# Patient Record
Sex: MALE | Race: WHITE | NOT HISPANIC OR LATINO | Employment: UNEMPLOYED | URBAN - METROPOLITAN AREA
[De-identification: names, ages, dates, MRNs, and addresses within clinical notes are randomized per-mention and may not be internally consistent; named-entity substitution may affect disease eponyms.]

---

## 2019-04-10 ENCOUNTER — OFFICE VISIT (OUTPATIENT)
Dept: URGENT CARE | Facility: CLINIC | Age: 59
End: 2019-04-10

## 2019-04-10 ENCOUNTER — APPOINTMENT (OUTPATIENT)
Dept: RADIOLOGY | Facility: IMAGING CENTER | Age: 59
End: 2019-04-10
Attending: PHYSICIAN ASSISTANT

## 2019-04-10 VITALS
WEIGHT: 180 LBS | DIASTOLIC BLOOD PRESSURE: 70 MMHG | HEIGHT: 69 IN | OXYGEN SATURATION: 96 % | TEMPERATURE: 98.1 F | RESPIRATION RATE: 16 BRPM | SYSTOLIC BLOOD PRESSURE: 112 MMHG | BODY MASS INDEX: 26.66 KG/M2 | HEART RATE: 60 BPM

## 2019-04-10 DIAGNOSIS — R06.02 SHORTNESS OF BREATH: ICD-10-CM

## 2019-04-10 DIAGNOSIS — Z72.0 TOBACCO ABUSE: ICD-10-CM

## 2019-04-10 PROCEDURE — 99203 OFFICE O/P NEW LOW 30 MIN: CPT | Performed by: PHYSICIAN ASSISTANT

## 2019-04-10 PROCEDURE — 71046 X-RAY EXAM CHEST 2 VIEWS: CPT | Mod: TC | Performed by: PHYSICIAN ASSISTANT

## 2019-04-10 RX ORDER — HYDROCHLOROTHIAZIDE 25 MG/1
25 TABLET ORAL DAILY
COMMUNITY

## 2019-04-10 RX ORDER — METOPROLOL TARTRATE 100 MG/1
100 TABLET ORAL 2 TIMES DAILY
COMMUNITY

## 2019-04-10 RX ORDER — ALBUTEROL SULFATE 90 UG/1
2 AEROSOL, METERED RESPIRATORY (INHALATION) EVERY 6 HOURS PRN
Qty: 8.5 G | Refills: 0 | Status: SHIPPED | OUTPATIENT
Start: 2019-04-10

## 2019-04-10 RX ORDER — METHYLPREDNISOLONE 4 MG/1
TABLET ORAL
Qty: 1 KIT | Refills: 0 | Status: SHIPPED | OUTPATIENT
Start: 2019-04-10

## 2019-04-10 RX ORDER — METHIMAZOLE 10 MG/1
10 TABLET ORAL 3 TIMES DAILY
COMMUNITY

## 2019-04-18 ASSESSMENT — ENCOUNTER SYMPTOMS
CHILLS: 0
DIZZINESS: 0
SHORTNESS OF BREATH: 1
VOMITING: 0
SORE THROAT: 0
ORTHOPNEA: 0
FEVER: 0
MUSCULOSKELETAL NEGATIVE: 1
RHINORRHEA: 0
COUGH: 0
NAUSEA: 0
ABDOMINAL PAIN: 0
HEMOPTYSIS: 0
SPUTUM PRODUCTION: 0
DIARRHEA: 0

## 2019-04-18 NOTE — PROGRESS NOTES
"Subjective:      True Sandoval is a 58 y.o. male who presents with Shortness of Breath (x2 months, happened suddenly one day and has not gone away)        Patient is accompanied by his partner.     Shortness of Breath   This is a new problem. The current episode started more than 1 month ago (2 months). The problem occurs intermittently. The problem has been waxing and waning. Pertinent negatives include no abdominal pain, chest pain, ear pain, fever, hemoptysis, orthopnea, rash, rhinorrhea, sore throat, sputum production or vomiting. The symptoms are aggravated by exercise and any activity. The patient has no known risk factors for DVT/PE. There is no history of asthma, chronic lung disease or pneumonia.     Patient presents to urgent care reporting a 2 month history of shortness of breath on exertion, even when walking short distances up a flight of stairs. He otherwise is feeling well and denies chest pain, palpitations, wheezing, cough, congestion, sore throat, sinus pressure, headaches, dizziness, change in vision, or diaphoresis. He has no known medical problems and doesn't take any regular medications. No personal or family history of cardiac disease. His is a current every day smoker with about 30 pack year history.     Review of Systems   Constitutional: Negative for chills and fever.   HENT: Negative for congestion, ear pain, rhinorrhea and sore throat.    Respiratory: Positive for shortness of breath. Negative for cough, hemoptysis and sputum production.    Cardiovascular: Negative for chest pain and orthopnea.   Gastrointestinal: Negative for abdominal pain, diarrhea, nausea and vomiting.   Genitourinary: Negative.    Musculoskeletal: Negative.    Skin: Negative for rash.   Neurological: Negative for dizziness.        Objective:     /70   Pulse 60   Temp 36.7 °C (98.1 °F)   Resp 16   Ht 1.753 m (5' 9\")   Wt 81.6 kg (180 lb)   SpO2 96%   BMI 26.58 kg/m²      Physical Exam   Constitutional: He is " oriented to person, place, and time. He appears well-developed and well-nourished. No distress.   HENT:   Head: Normocephalic and atraumatic.   Right Ear: Hearing, tympanic membrane, external ear and ear canal normal.   Left Ear: Hearing, external ear and ear canal normal.   Mouth/Throat: Oropharynx is clear and moist. No oropharyngeal exudate, posterior oropharyngeal edema or posterior oropharyngeal erythema.   Eyes: Pupils are equal, round, and reactive to light. Conjunctivae are normal. Right eye exhibits no discharge. Left eye exhibits no discharge.   Neck: Normal range of motion.   Cardiovascular: Normal rate, regular rhythm and normal heart sounds.    No murmur heard.  Pulmonary/Chest: Effort normal and breath sounds normal. No respiratory distress. He has no wheezes. He has no rales.   Musculoskeletal: Normal range of motion.   Neurological: He is alert and oriented to person, place, and time.   Skin: Skin is warm and dry. He is not diaphoretic.   Psychiatric: He has a normal mood and affect. His behavior is normal.   Nursing note and vitals reviewed.       PMH:  has no past medical history on file.  MEDS:   Current Outpatient Prescriptions:   •  hydroCHLOROthiazide (HYDRODIURIL) 25 MG Tab, Take 25 mg by mouth every day., Disp: , Rfl:   •  metoprolol (LOPRESSOR) 100 MG Tab, Take 100 mg by mouth 2 times a day., Disp: , Rfl:   •  methimazole (TAPAZOLE) 10 MG Tab, Take 10 mg by mouth 3 times a day., Disp: , Rfl:   •  MethylPREDNISolone (MEDROL DOSEPAK) 4 MG Tablet Therapy Pack, Take as directed, Disp: 1 Kit, Rfl: 0  •  albuterol 108 (90 Base) MCG/ACT Aero Soln inhalation aerosol, Inhale 2 Puffs by mouth every 6 hours as needed for Shortness of Breath., Disp: 8.5 g, Rfl: 0  ALLERGIES:   Allergies   Allergen Reactions   • Penicillins      SURGHX: History reviewed. No pertinent surgical history.  SOCHX:  reports that he has been smoking.  He has never used smokeless tobacco. He reports that he drinks alcohol. He  reports that he does not use drugs.  FH: family history is not on file.       Assessment/Plan:     1. Shortness of breath  - DX-CHEST-2 VIEWS; Future  Impression       No acute cardiopulmonary findings.     report per radiology.  I have reviewed the films and agree with the reading    - EKG - Clinic Performed --> normal sinus rhythm, no evidence of acute ischemic changes or arrhythmias   - MethylPREDNISolone (MEDROL DOSEPAK) 4 MG Tablet Therapy Pack; Take as directed  Dispense: 1 Kit; Refill: 0  - albuterol 108 (90 Base) MCG/ACT Aero Soln inhalation aerosol; Inhale 2 Puffs by mouth every 6 hours as needed for Shortness of Breath.  Dispense: 8.5 g; Refill: 0    Trial of medrol dosepak and albuterol inhaler given at today's visit. Smoking cessation stressed. Encouraged to monitor symptoms closely and RTC or follow up with primary care for any persistent/worsening symptoms. The patient demonstrated a good understanding and agreed with the treatment plan.      2. Tobacco abuse    Tobacco cessation counseling was provided to the patient for a duration of three to seven minutes.  Tobacco effects, benefits of cessation and methods to use to achieve this goal were briefly discussed. An information handout was offered.